# Patient Record
(demographics unavailable — no encounter records)

---

## 2020-05-10 NOTE — NUR
-------------------------------------------------------------------------------

            *** Note undone in [facility] - 05/10/20 at 2143 by KSHARP ***            

-------------------------------------------------------------------------------

Pt back from imaging, on monitor, states "no" to pain.

## 2020-05-10 NOTE — NUR
ERMD MACI AT BEDSIDE FOR EVAL. THIS IS A 33 YO FEMALE WHO PRESENTS TO THE ER 
C/O RIGHT UPPER CHEEK "NUMBNESS/TINGLING" AND INTERMITTENT TINGLING TO [facility] 
BACK OF HEAD X A FEW DAYS. PT WENT TO  [facility] TODAY WHEN SHE HAD NECK, BILAT 
SHOULDER AND RIGHT ARM PAIN. PT REPORTS PAIN HAS RESOLVED. PT C/O CONT RIGHT 
CHEEK PARASTHESIAS. FACE IS SYMMETRICAL. EQUAL  BILATERALLY. NEURO 
SENSATION INTACT. PT ABLE TO MOVE ALL EXTREMITIES W/O DIFFICULTY. AO X 4. PT ON 
CONT BP AND O2 MONITORS. SIGNIFICANT OTHER AT BEDSIDE.

-------------------------------------------------------------------------------

Addendum: 05/10/20 at 2150 by SANGEETA

-------------------------------------------------------------------------------

ERMD MACI AT BEDSIDE FOR EVAL. THIS IS A 33 YO FEMALE WHO PRESENTS TO THE ER 
C/O RIGHT UPPER CHEEK "NUMBNESS/TINGLING" AND INTERMITTENT TINGLING TO [facility] 
BACK [facility] X A FEW HOURS. PT WENT TO  [facility] TODAY WHEN SHE HAD NECK, 
BILAT SHOULDER AND RIGHT ARM PAIN. PT REPORTS PAIN HAS RESOLVED. PT C/O CONT 
RIGHT CHEEK PARASTHESIAS. FACE IS SYMMETRICAL. EQUAL  BILATERALLY. NEURO 
SENSATION INTACT. PT ABLE TO MOVE ALL EXTREMITIES W/O DIFFICULTY. AO X 4. PT ON 
CONT BP AND O2 MONITORS. SIGNIFICANT OTHER AT BEDSIDE.

## 2021-05-16 NOTE — NUR
Lab at bedside

Ua sent

Patient deferring medication for pain/nausea "I don't want meds if I might be 
pregnant." Patient educated on safety despite potential pregnancy

## 2021-09-04 NOTE — NUR
PT AMBULATED STEADILY TO BR FOR UA SAMPLE (SENT TO LAB), BACK TO [facility] AWAKE & 
COMFORTABLE, RESPONDS APPROP TO STAFF, NAD, COMFORT MEASURES PROVIDED, SO AT 
BS, CALL LIGHT WITHIN REACH.